# Patient Record
Sex: FEMALE | Race: WHITE | NOT HISPANIC OR LATINO | Employment: FULL TIME | URBAN - METROPOLITAN AREA
[De-identification: names, ages, dates, MRNs, and addresses within clinical notes are randomized per-mention and may not be internally consistent; named-entity substitution may affect disease eponyms.]

---

## 2017-10-19 ENCOUNTER — HOSPITAL ENCOUNTER (OUTPATIENT)
Facility: MEDICAL CENTER | Age: 62
End: 2017-10-19
Payer: COMMERCIAL

## 2017-10-19 LAB
ALBUMIN SERPL BCP-MCNC: 3.9 G/DL (ref 3.2–4.9)
ALBUMIN/GLOB SERPL: 1.2 G/DL
ALP SERPL-CCNC: 94 U/L (ref 30–99)
ALT SERPL-CCNC: 14 U/L (ref 2–50)
ANION GAP SERPL CALC-SCNC: 4 MMOL/L (ref 0–11.9)
AST SERPL-CCNC: 15 U/L (ref 12–45)
BDY FAT % MEASURED: 47.9 %
BILIRUB SERPL-MCNC: 0.4 MG/DL (ref 0.1–1.5)
BP DIAS: 82 MMHG
BP SYS: 126 MMHG
BUN SERPL-MCNC: 21 MG/DL (ref 8–22)
CALCIUM SERPL-MCNC: 9.2 MG/DL (ref 8.5–10.5)
CHLORIDE SERPL-SCNC: 110 MMOL/L (ref 96–112)
CHOLEST SERPL-MCNC: 147 MG/DL (ref 100–199)
CO2 SERPL-SCNC: 27 MMOL/L (ref 20–33)
CREAT SERPL-MCNC: 0.73 MG/DL (ref 0.5–1.4)
DIABETES HTDIA: NO
EVENT NAME HTEVT: NORMAL
GFR SERPL CREATININE-BSD FRML MDRD: >60 ML/MIN/1.73 M 2
GLOBULIN SER CALC-MCNC: 3.3 G/DL (ref 1.9–3.5)
GLUCOSE SERPL-MCNC: 92 MG/DL (ref 65–99)
HDLC SERPL-MCNC: 52 MG/DL
HYPERTENSION HTHYP: NO
LDLC SERPL CALC-MCNC: 65 MG/DL
POTASSIUM SERPL-SCNC: 4.3 MMOL/L (ref 3.6–5.5)
PROT SERPL-MCNC: 7.2 G/DL (ref 6–8.2)
SCREENING LOC CITY HTCIT: NORMAL
SCREENING LOC STATE HTSTA: NORMAL
SCREENING LOCATION HTLOC: NORMAL
SODIUM SERPL-SCNC: 141 MMOL/L (ref 135–145)
SUBSCRIBER ID HTSID: NORMAL
TRIGL SERPL-MCNC: 151 MG/DL (ref 0–149)

## 2018-02-13 ENCOUNTER — OFFICE VISIT (OUTPATIENT)
Dept: URGENT CARE | Facility: PHYSICIAN GROUP | Age: 63
End: 2018-02-13
Payer: COMMERCIAL

## 2018-02-13 VITALS
OXYGEN SATURATION: 93 % | BODY MASS INDEX: 42.03 KG/M2 | DIASTOLIC BLOOD PRESSURE: 76 MMHG | SYSTOLIC BLOOD PRESSURE: 118 MMHG | TEMPERATURE: 99 F | HEART RATE: 96 BPM | WEIGHT: 245 LBS

## 2018-02-13 DIAGNOSIS — J98.8 RESPIRATORY INFECTION: ICD-10-CM

## 2018-02-13 DIAGNOSIS — R06.2 WHEEZING: ICD-10-CM

## 2018-02-13 DIAGNOSIS — H66.003 ACUTE SUPPURATIVE OTITIS MEDIA OF BOTH EARS WITHOUT SPONTANEOUS RUPTURE OF TYMPANIC MEMBRANES, RECURRENCE NOT SPECIFIED: ICD-10-CM

## 2018-02-13 PROCEDURE — 99214 OFFICE O/P EST MOD 30 MIN: CPT | Performed by: PHYSICIAN ASSISTANT

## 2018-02-13 RX ORDER — AZITHROMYCIN 250 MG/1
TABLET, FILM COATED ORAL
Qty: 6 TAB | Refills: 0 | Status: SHIPPED | OUTPATIENT
Start: 2018-02-13

## 2018-02-13 RX ORDER — IPRATROPIUM BROMIDE AND ALBUTEROL SULFATE 2.5; .5 MG/3ML; MG/3ML
3 SOLUTION RESPIRATORY (INHALATION) ONCE
Status: COMPLETED | OUTPATIENT
Start: 2018-02-13 | End: 2018-02-13

## 2018-02-13 RX ADMIN — IPRATROPIUM BROMIDE AND ALBUTEROL SULFATE 3 ML: 2.5; .5 SOLUTION RESPIRATORY (INHALATION) at 16:22

## 2018-02-13 ASSESSMENT — ENCOUNTER SYMPTOMS
TINGLING: 0
FEVER: 0
VOMITING: 0
COUGH: 1
SINUS PAIN: 1
DIARRHEA: 0
SORE THROAT: 1
EYE DISCHARGE: 0
MYALGIAS: 1
RHINORRHEA: 0
SPUTUM PRODUCTION: 1
DIZZINESS: 0
WHEEZING: 0
HEADACHES: 0
CHILLS: 0
ABDOMINAL PAIN: 0
EYE REDNESS: 0
NECK PAIN: 0

## 2018-02-13 NOTE — LETTER
February 13, 2018         Patient: Belem Franz   YOB: 1955   Date of Visit: 2/13/2018           To Whom it May Concern:    Belem Franz was seen in my clinic on 2/13/2018. Please excuse this patient from work due to recent illness- she will out Wed- Friday. She may return to her normal schedule next Tuesday.     If you have any questions or concerns, please don't hesitate to call.        Sincerely,           Pranav Ndiaye P.A.-C.  Electronically Signed

## 2018-02-14 NOTE — PROGRESS NOTES
Subjective:      Belem Franz is a 62 y.o. female who presents with Sore Throat (x2days, productive cough)            Cough   This is a new problem. The current episode started more than 1 month ago. The problem has been waxing and waning. The problem occurs hourly. Cough characteristics: Sputum production in the morning, dry during the day.  Associated symptoms include ear congestion, ear pain, myalgias, nasal congestion, postnasal drip and a sore throat. Pertinent negatives include no chest pain, chills, eye redness, fever, headaches, rash, rhinorrhea or wheezing. Nothing aggravates the symptoms. Treatments tried: Aleve. The treatment provided mild relief. There is no history of asthma or bronchitis.       Review of Systems   Constitutional: Positive for malaise/fatigue. Negative for chills and fever.   HENT: Positive for ear pain, postnasal drip, sinus pain and sore throat. Negative for congestion, ear discharge and rhinorrhea.    Eyes: Negative for discharge and redness.   Respiratory: Positive for cough and sputum production. Negative for wheezing.         Slight SOB last night.      Cardiovascular: Negative for chest pain and leg swelling.   Gastrointestinal: Negative for abdominal pain, diarrhea and vomiting.   Genitourinary: Negative for dysuria and urgency.   Musculoskeletal: Positive for myalgias. Negative for neck pain.   Skin: Negative for itching and rash.   Neurological: Negative for dizziness, tingling and headaches.          Objective:     /76   Pulse 96   Temp 37.2 °C (99 °F)   Wt 111.1 kg (245 lb)   LMP 04/01/2013   SpO2 93%   BMI 42.03 kg/m²    PMH:  has a past medical history of Anemia; Arthritis; Osteoarthritis of both knees; Pain; and Snoring.  MEDS:   Current Outpatient Prescriptions:   •  azithromycin (ZITHROMAX) 250 MG Tab, Take 2 tabs today, then 1 tab daily til completed., Disp: 6 Tab, Rfl: 0  •  azithromycin (ZITHROMAX) 250 MG Tab, Take two tabs on day one followed by  one tab on days 2-5., Disp: 6 Tab, Rfl: 0  •  aspirin  MG Tablet Delayed Response, Take 1 Tab by mouth 2 times a day. (Patient taking differently: Take 81 mg by mouth 2 times a day.), Disp: 60 Tab, Rfl: 0  •  Multiple Vitamins-Minerals (MULTIVITAMIN PO), Take  by mouth every day., Disp: , Rfl:     Current Facility-Administered Medications:   •  ipratropium-albuterol (DUONEB) nebulizer solution 3 mL, 3 mL, Nebulization, Once, Pranav Ndiaye P.A.-C.  ALLERGIES:   Allergies   Allergen Reactions   • Cillins [Penicillins]      hives     SURGHX:   Past Surgical History:   Procedure Laterality Date   • KNEE ARTHROPLASTY TOTAL Right 6/14/2016    Procedure: KNEE ARTHROPLASTY TOTAL;  Surgeon: Clayton Georgse M.D.;  Location: SURGERY [facility];  Service:    • DENTAL EXTRACTION(S)  1998    wisdom teeth under local   • DILATION AND CURETTAGE  1980,1981,1992    x3, heavy menses     SOCHX:  reports that she has never smoked. She has never used smokeless tobacco. She reports that she drinks alcohol. She reports that she does not use drugs.  FH: Family history was reviewed, no pertinent findings to report    Physical Exam   Constitutional: She is oriented to person, place, and time. She appears well-developed and well-nourished.   HENT:   Head: Normocephalic and atraumatic.   Mouth/Throat: No oropharyngeal exudate.   Ears- Canals clear- bilateral TMs with erythema and bulging, middle ear effusion worse on the right than the left. TM intact.  Pos. PND, with slight erythema- without tonsillar edema or exudate.   Mild discharge noted bilaterally- to nares.      Eyes: EOM are normal. Pupils are equal, round, and reactive to light.   Neck: Normal range of motion. Neck supple.   Cardiovascular: Normal rate and regular rhythm.    No murmur heard.  Pulmonary/Chest: Effort normal. No respiratory distress. She has wheezes.   Right lower lung field expiratory wheezing.   Musculoskeletal: Normal range of motion. She  exhibits no tenderness.   Lymphadenopathy:     She has no cervical adenopathy.   Neurological: She is alert and oriented to person, place, and time.   Skin: Skin is warm. No rash noted.   Psychiatric: She has a normal mood and affect. Her behavior is normal.   Vitals reviewed.              Assessment/Plan:     1. Acute suppurative otitis media of both ears without spontaneous rupture of tympanic membranes, recurrence not specified  - ipratropium-albuterol (DUONEB) nebulizer solution 3 mL; 3 mL by Nebulization route Once.  - azithromycin (ZITHROMAX) 250 MG Tab; Take 2 tabs today, then 1 tab daily til completed.  Dispense: 6 Tab; Refill: 0    2. Wheezing  3. Respiratory infection      Recheck after DuoNeb treatment today-Minimal improvement with breathing tx- POX increased to 98%.    Encouraged OTC supportive meds PRN. Humidification, increase fluids, avoid night time dairy.   Patient given precautionary s/sx that mandate immediate follow up and evaluation in the ED. Advised of risks of not doing so.    DDX, Supportive care, and indications for immediate follow-up discussed with patient.    Instructed to return to clinic or nearest emergency department if we are not available for any change in condition, further concerns, or worsening of symptoms.    The patient demonstrated a good understanding and agreed with the treatment plan.

## 2018-10-18 ENCOUNTER — HOSPITAL ENCOUNTER (OUTPATIENT)
Facility: MEDICAL CENTER | Age: 63
End: 2018-10-18
Payer: COMMERCIAL

## 2018-10-18 LAB
BDY FAT % MEASURED: 44.6 %
BP DIAS: 80 MMHG
BP SYS: 120 MMHG
DIABETES HTDIA: NO
EVENT NAME HTEVT: NORMAL
HYPERTENSION HTHYP: NO
SCREENING LOC CITY HTCIT: NORMAL
SCREENING LOC STATE HTSTA: NORMAL
SCREENING LOCATION HTLOC: NORMAL
SUBSCRIBER ID HTSID: NORMAL

## 2018-10-19 LAB
CHOLEST SERPL-MCNC: 164 MG/DL (ref 100–199)
FASTING STATUS PATIENT QL REPORTED: NORMAL
GLUCOSE SERPL-MCNC: 108 MG/DL (ref 65–99)
HDLC SERPL-MCNC: 53 MG/DL
LDLC SERPL CALC-MCNC: 82 MG/DL
TRIGL SERPL-MCNC: 144 MG/DL (ref 0–149)

## 2021-03-03 DIAGNOSIS — Z23 NEED FOR VACCINATION: ICD-10-CM
